# Patient Record
Sex: FEMALE | Race: WHITE
[De-identification: names, ages, dates, MRNs, and addresses within clinical notes are randomized per-mention and may not be internally consistent; named-entity substitution may affect disease eponyms.]

---

## 2017-01-12 ENCOUNTER — HOSPITAL ENCOUNTER (OUTPATIENT)
Dept: HOSPITAL 80 - FSGY | Age: 57
Discharge: HOME | End: 2017-01-12
Attending: ORTHOPAEDIC SURGERY
Payer: COMMERCIAL

## 2017-01-12 DIAGNOSIS — S83.282A: ICD-10-CM

## 2017-01-12 DIAGNOSIS — I10: ICD-10-CM
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
## 2017-01-12 DIAGNOSIS — S83.242A: Primary | ICD-10-CM

## 2017-01-12 DIAGNOSIS — G47.33: ICD-10-CM

## 2017-01-12 DIAGNOSIS — M22.42: ICD-10-CM

## 2017-01-12 DIAGNOSIS — K21.9: ICD-10-CM

## 2017-01-12 PROCEDURE — 0SBD4ZZ EXCISION OF LEFT KNEE JOINT, PERCUTANEOUS ENDOSCOPIC APPROACH: ICD-10-PCS | Performed by: ORTHOPAEDIC SURGERY

## 2017-01-12 NOTE — GOP
[f rep st]



                                                                OPERATIVE REPORT





DATE OF OPERATION:  01/12/2017



SURGEON:  SHIRAZ Morillo MD



ANESTHESIA:  General, Dr. Castillo.



PREOPERATIVE DIAGNOSIS:  

1.  Medical meniscus tear, left knee.

2.  Patellofemoral chondromalacia, left knee.



POSTOPERATIVE DIAGNOSIS:  

1.  Medical meniscus tear, left knee.

2.  Lateral meniscus tear, left knee.

3.  Patellofemoral chondromalacia, left knee.



PROCEDURE PERFORMED:  

1.  Left knee arthroscopy with partial medial meniscectomy.

2.  Partial lateral meniscectomy, left knee.



FINDINGS:  



INDICATIONS:  This is a 56-year-old female who injured her knee resulting in the above diagnosis.  Sh
liza has failed nonoperative treatment, and is being admitted for surgical care.



DESCRIPTION OF PROCEDURE:  After an adequate general anesthetic was obtained, exam under anesthesia r
evealed a full range of motion.  Lachman was stable.  There was no varus, valgus or posterolateral la
xity present.  Pivot shift stable.  



The patient's left lower extremity was placed in the leg holding device with adequate padding, and wa
s prepped and draped in the usual sterile fashion.  The limb was exsanguinated with the Esmarch, and 
the tourniquet elevated to 300 mmHg pressure.  



The standard superolateral, anteromedial and anterolateral arthroscopic portals were established with
 an 11 blade.  A 4 mm 30-degree arthroscope was introduced through the anterolateral portal, and a sy
stematic examination of the knee was carried out.  Suprapatellar pouch, medial and lateral gutters re
vealed mild synovitis.  The patellar articular surface revealed very severe, deep grade 3 chondromala
margaret which required a minimal chondroplasty.  The femoral trochlea also revealed grade 2-3 chondromala
margaret centrally. 



The ACL and PCL were normal.  



The lateral compartment was entered.  The lateral articular surfaces revealed grade 1 softening.  The
re was a small radial tear at the junction of the middle third and posterior horn of the lateral meni
scus.  A partial lateral meniscectomy was carried out, resecting this back to a stable 6 mm rim.  The
 final rim was contoured and balanced with a small shaver.  



The medial compartment was entered.  There was grade 1 softening on the medial femoral condyle and ti
bial plateau.  There was a grade 2 chondral fissure in the tibial plateau which was stable to probing
.  There was a flap tear in the posterior horn of the medial meniscus which was unstable to probing a
nd was unrepairable as it was entirely in the avascular zone.  A partial medial meniscectomy was osborne
ied out resecting this back to a stable 3 mm rim.  The final rim was contoured and balanced with a 
all shaver.  



The 70-degree scope was introduced posteromedially and posterolaterally with no further posterior pat
hology noted. 



The knee was irrigated until clear.  The portals were closed using interrupted 3-0 nylon sutures.  20
 cc of 0.25% plain Marcaine was injected intra-articularly. 



Sterile dressings were applied, followed by an Ace wrap and the tourniquet deflated after 27 minutes 
tourniquet time. 



There were no complications.  The patient tolerated the procedure well, and returned to the recovery 
room in stable condition.





Job #:  [id number]

## 2017-02-08 ENCOUNTER — HOSPITAL ENCOUNTER (EMERGENCY)
Dept: HOSPITAL 80 - CED | Age: 57
Discharge: HOME | End: 2017-02-08
Payer: COMMERCIAL

## 2017-02-08 VITALS
DIASTOLIC BLOOD PRESSURE: 106 MMHG | SYSTOLIC BLOOD PRESSURE: 152 MMHG | HEART RATE: 81 BPM | TEMPERATURE: 97.2 F | RESPIRATION RATE: 93 BRPM

## 2017-02-08 DIAGNOSIS — M71.22: ICD-10-CM

## 2017-02-08 DIAGNOSIS — R22.42: Primary | ICD-10-CM

## 2017-02-08 NOTE — US
Left Lower Extremity Ultrasound and Venous Duplex Doppler Study



History:  Left knee surgery January 12, 2017, foot swelling.



Comparison: None available.



Technique:  High frequency transducer was used for imaging and Doppler study of the veins of the lowe
r extremity.   Pulsed Doppler and color Doppler were utilized, along with various maneuvers to assess
 flow in the veins. 

 

Findings:  The deep veins of the lower extremity are normally compressible between the groin and the 
upper calf and have normal Doppler waveforms within them.  The calf veins are difficult to visualize.
 No venous thrombosis is identified. There is a 3.0 x 0.9 x 5.1 cm hypoechoic collection in the popli
teal fossa, suggesting a mildly complex Baker's cyst.

  

Impression:  

1. No evidence of deep vein thrombosis.

2. Baker's cyst.



Findings discussed with Dr. Jazmine Turk, on February 8, 2017 at 1629 hours.

## 2017-02-08 NOTE — UCPHY
H & P


Patient Type: New


Chief Complaint Nursing Narrative: left leg swollen . R/O blood clot


Source: Patient


Exam Limitations: No limitations





- Personal History


Tetanus Vaccine Date: WITHIN 10 YRS





- Medical/Surgical History


Hx Asthma: No


Hx Chronic Respiratory Disease: No


Hx Diabetes: No


Hx Cardiac Disease: No


Hx Renal Disease: No


Hx Cirrhosis: No


Hx Alcoholism: No


Hx HIV/AIDS: No


Hx Splenectomy or Spleen Trauma: No


Other PMH: knee surgery on January 12th. Airplane flight last night





- Family History


Significant Family History: Other (Daughter had DVT and pulmonary embolism)





- Social History


Smoking Status: Never smoked


Time Seen by Provider: 02/08/17 16:28


HPI/ROS: 


HPI:  57-year-old female presents to urgent care with chief concern left leg 

swelling. Had a left knee scope on January 12th.  She flu yesterday evening 

from [place] to [place].  Reports onset of left ankle and left leg swelling 

today.  Denies fever, chills, shortness of breath, chest pain, nausea, vomiting

, calf pain. No aggravating or alleviating factors.  Daughter had DVT and 

pulmonary embolism could





ROS:10 point review of systems is negative other than as stated in HPI (Marissa Jaimes)








- Social History


Additional Social History: 


RN on Oncology floor (Marissa Jaimes)








- Physical Exam


Exam: 


Vital signs reviewed by me


General:  Awake, alert, calm, cooperative.  No acute distress.


Head:  Normalocephalic.  Atraumatic.


EENT:  PERRLA.  EOMI.  No pallor or injection. Anicteric. No nystagmus. No 

injection. 


Neck:  Supple, nontender.  No lymphadenopathy.  Full range of motion.  No 

meningismus.


Respiratory:  Breathing unlabored.  Breath sounds equal bilaterally and clear 

to auscultation.  No adventitious sounds.


CV:  Chest nontender, atraumatic. Heart rate regular.  No murmur, distal pulses 

2+ bilaterally. Brisk cap refill all extremities.


GI:  Abdomen soft, nontender.  Bowel sounds normoactive and positive x4 

quadrants.


Neuro:  Alert.  Oriented x 3.  Speech clear. Nonfocal cranial nerves 

throughout. Sensation intact all extremities.


Skin:  Skin warm, dry, intact.  Skin turgor normal. 


Extremities:  Full range of motion in all 4 extremities. Strength 5+ all 

extremities. Negative calf pain. Negative Homans.  1+ pitting pedal edema left 

ankle. (Marissa Jaimes)





Constitutional: 





 Initial Vital Signs











Temperature (C)  36.2 C   02/08/17 15:14


 


Heart Rate  81   02/08/17 15:14


 


Respiratory Rate  93 H  02/08/17 15:14


 


Blood Pressure  152/106 H  02/08/17 15:14








 











O2 Delivery Mode               Room Air

















Allergies/Adverse Reactions: 


 





acetaminophen [From Percocet] Allergy (Verified 01/09/14 08:17)


 


adhesive Allergy (Verified 03/25/14 11:55)


 


oxycodone HCl [From Percocet] Allergy (Verified 01/09/14 08:17)


 


pneumococcal 23-valent polysacchari [From Pneumovax 23] Allergy (Verified 03/22/ 11 20:30)


 


Shellfish *RETIRED-09/10/12 [Shellfish] Allergy (Verified 03/22/11 20:30)


 








Home Medications: 














 Medication  Instructions  Recorded


 


ALPRAZolam [Xanax] 0.5 - 1 mg PO PRN PRN MDD 1 03/22/11


 


ESTRADIOL 1 mg PO DAILY 03/22/11


 


Lisinopril/Hydrochlorothiazide 1 each PO DAILY 03/22/11





[Lisinopril-Hctz 20-12.5 Mg Tab]  


 


Temazepam [Restoril] 20 - 30 mg PO HS PRN 03/22/11














Medical Decision Making





- Diagnostics


Imaging: 


Ultrasound negative for DVT.  Positive Méndez's cyst.  Final report pending at 

time this dictation. (Marissa Jaimes)





ED Course/Re-evaluation: 


57-year-old female presents to urgent care with left leg swelling that onset 

over the last 24 hours.  No shortness of breath or chest pain. Denies calf 

pain. Ultrasound negative for DVT. (Marissa Jaimes)





Differential Diagnosis: 


Differential diagnosis includes but is not limited to dependent edema, CHF, 

venous stasis (Marissa Jaimes)





Other Provider: 


The patient was evaluated and managed by the nurse practitioner, Marissa Jaimes. 

My co-signature indicates that I





have reviewed this chart and I agree with the findings and plan of care as 

documented. I am the





secondary supervising physician. (Jazmine Turk)








Departure





- Departure


Disposition: Home, Routine, Self-Care


Clinical Impression: 


 Leg swelling, Baker cyst


Condition: Good


Instructions:  Leg Edema (ED), Bakers Cyst (ED)


Additional Instructions: 


Plan:





Ultrasound is negative for DVT





You have a Baker's cyst





Follow up with your primary care provider tomorrow for recheck without fail--

When you call to schedule appointment, please let the office know you are an "

ER follow up" appointment"


Referrals: 


Genia Martinez MD [Primary Care Provider] - As per Instructions


Katie Johnson PA [Physician Assistant] - As per Instructions





- PQRS


PQRS Measurement: 


Not applicable (Marissa Jaimes)

## 2017-08-16 ENCOUNTER — HOSPITAL ENCOUNTER (OUTPATIENT)
Dept: HOSPITAL 80 - FIMAGING | Age: 57
End: 2017-08-16
Attending: PHYSICIAN ASSISTANT
Payer: COMMERCIAL

## 2017-08-16 DIAGNOSIS — R92.8: ICD-10-CM

## 2017-08-16 DIAGNOSIS — Z12.39: Primary | ICD-10-CM

## 2017-08-16 PROCEDURE — G0204 DX MAMMO INCL CAD BI: HCPCS

## 2019-01-23 ENCOUNTER — HOSPITAL ENCOUNTER (OUTPATIENT)
Dept: HOSPITAL 80 - FIMAGING | Age: 59
End: 2019-01-23
Attending: PHYSICIAN ASSISTANT
Payer: COMMERCIAL

## 2019-01-23 DIAGNOSIS — Z80.3: ICD-10-CM

## 2019-01-23 DIAGNOSIS — Z12.31: Primary | ICD-10-CM

## 2019-01-23 DIAGNOSIS — N63.21: ICD-10-CM

## 2019-03-08 ENCOUNTER — HOSPITAL ENCOUNTER (OUTPATIENT)
Dept: HOSPITAL 80 - FIMAGING | Age: 59
End: 2019-03-08
Attending: PHYSICIAN ASSISTANT
Payer: COMMERCIAL

## 2019-03-08 DIAGNOSIS — K57.32: Primary | ICD-10-CM

## 2019-03-08 DIAGNOSIS — K42.9: ICD-10-CM

## 2019-03-08 DIAGNOSIS — Z90.49: ICD-10-CM

## 2019-03-08 DIAGNOSIS — Z90.710: ICD-10-CM

## 2019-03-11 ENCOUNTER — HOSPITAL ENCOUNTER (OUTPATIENT)
Dept: HOSPITAL 80 - FED | Age: 59
Setting detail: OBSERVATION
LOS: 1 days | Discharge: HOME | End: 2019-03-12
Attending: INTERNAL MEDICINE | Admitting: INTERNAL MEDICINE
Payer: COMMERCIAL

## 2019-03-11 DIAGNOSIS — F43.10: ICD-10-CM

## 2019-03-11 DIAGNOSIS — E87.0: ICD-10-CM

## 2019-03-11 DIAGNOSIS — I10: ICD-10-CM

## 2019-03-11 DIAGNOSIS — K57.32: Primary | ICD-10-CM

## 2019-03-11 DIAGNOSIS — R11.2: ICD-10-CM

## 2019-03-11 DIAGNOSIS — K59.00: ICD-10-CM

## 2019-03-11 LAB — PLATELET # BLD: 367 10^3/UL (ref 150–400)

## 2019-03-11 PROCEDURE — G0378 HOSPITAL OBSERVATION PER HR: HCPCS

## 2019-03-11 PROCEDURE — 74019 RADEX ABDOMEN 2 VIEWS: CPT

## 2019-03-11 RX ADMIN — SODIUM CHLORIDE SCH MLS: 900 INJECTION, SOLUTION INTRAVENOUS at 16:48

## 2019-03-11 RX ADMIN — TAZOBACTAM SODIUM AND PIPERACILLIN SODIUM SCH MLS: 375; 3 INJECTION, SOLUTION INTRAVENOUS at 17:44

## 2019-03-11 NOTE — EDPHY
H & P


Stated Complaint: abd pain


Time Seen by Provider: 03/11/19 14:11


HPI/ROS: 





CHIEF COMPLAINT:  Diverticulitis





HISTORY OF PRESENT ILLNESS:  This is a 59-year-old female who was diagnosed 

with diverticulitis 3 days ago.  At that time she underwent CT scanning of her 

abdomen, ordered by her primary care provider.  This study showed acute 

diverticulitis involving the distal descending colon and upper sigmoid with 

extensive pericolonic stranding but no abscess, pneumoperitoneum, mechanical 

obstruction.  She was started on ciprofloxacin, Flagyl, and ondansetron.  She 

began a clear liquid diet.  However, for the past 2 days, she has been vomiting 

and unable to take her oral medications.  As result, she presents the emergency 

department today.  She is not had a bowel movement for the last 2 days.  At no 

time did she have bloody diarrhea.  She has not had fever.  This is her 1st 

bout of diverticulitis.





REVIEW OF SYSTEMS:


A ten system review of systems was performed and is negative with the exception 

of the items mentioned in the HPI.





Past medical and surgical history:  


1. PTSD


2. Hypertension


3. Left meniscus repair


4. Hysterectomy 


5. Cholecystectomy





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  She lives with .  She does not use tobacco products.





General Appearance:  Alert.  Vital signs reviewed.  


Eyes:  Pupils equal and round, no conjunctival injection, no discharge. 

Anicteric.


ENT, Mouth:  Mucous membranes are slightly dry, no oropharyngeal erythema or 

edema.


Neck:  No lymphadenopathy, supple.


Respiratory:  Lungs are clear to auscultation; no wheezes, rales, or rhonchi.


Cardiovascular:  Regular rate and rhythm; no murmur, rub, or gallop.


Gastrointestinal:  Abdomen is soft with tenderness and guarding in the left 

lower quadrant.


Skin:  Warm and dry, no rashes on exposed skin, normal color.


Back:  Nontender to palpation over the thoracolumbar spine. No CVAT.


Extremities:  No lower extremity edema, no calf tenderness or swelling.


Neurological:  Alert and oriented.  Moving all four extremities easily and 

equally.


Psychiatric:  Normal affect.





- Personal History


Current Tetanus/Diphtheria Vaccine: Yes


Current Tetanus Diphtheria and Acellular Pertussis (TDAP): Yes


Tetanus Vaccine Date: WITHIN 10 YRS





- Medical/Surgical History


Hx Asthma: No


Hx Chronic Respiratory Disease: No


Hx Diabetes: No


Hx Cardiac Disease: No


Hx Renal Disease: No


Hx Cirrhosis: No


Hx Alcoholism: No


Hx HIV/AIDS: No


Hx Splenectomy or Spleen Trauma: No


Other PMH: knee surgery on January 12th. Airplane flight last night, 

diverticulitis





- Social History


Smoking Status: Never smoked


Constitutional: 


 Initial Vital Signs











Temperature (C)  36.8 C   03/11/19 13:51


 


Heart Rate  90   03/11/19 13:51


 


Respiratory Rate  16   03/11/19 13:51


 


Blood Pressure  112/87 H  03/11/19 13:51


 


O2 Sat (%)  91 L  03/11/19 13:51








 











O2 Delivery Mode               Room Air














Allergies/Adverse Reactions: 


 





Shellfish *RETIRED-09/10/12 [Shellfish] Allergy (Severe, Verified 03/11/19 15:33

)


 Anaphylaxis


adhesive Allergy (Verified 03/11/19 13:50)


 


oxycodone HCl [From Percocet] Allergy (Verified 03/11/19 16:42)


 "Things go far away"


pneumococcal 23-valent polysacchari [From Pneumovax 23] Allergy (Verified 03/11/ 19 13:50)


 








Home Medications: 














 Medication  Instructions  Recorded


 


Ciprofloxacin [Cipro] 500 mg PO BID 03/11/19


 


EPINEPHrine [Epipen 0.3 MG] 0.3 mg IM ONCE PRN 03/11/19


 


Estradiol [Estradiol 1 MG (*)] 1 mg PO DAILY 03/11/19


 


Lisinopril/Hydrochlorothiazide 1 each PO DAILY 03/11/19





[Lisinopril-Hctz 10-12.5 mg Tab]  


 


Ondansetron [Ondansetron Odt] 8 mg PO TID 03/11/19


 


metroNIDAZOLE [Flagyl 500 mg (*)] 500 mg PO TID 03/11/19














Medical Decision Making


ED Course/Re-evaluation: 





59-year-old female with diverticulitis who has failed treatment.  She is being 

admitted to the hospitalist service.  In the emergency department ceftriaxone 2 

g IV and Flagyl 500 mg a ordered.  1 L normal saline administered.  Pain 

medicine available as needed.





She is afebrile.  Her abdomen is quite tender in left lower quadrant.  She will 

need to watch carefully for any evidence of abscess, perforation, or 

obstruction.





Labs pending at 3:25 p.m..





I reviewed the report of her CT scan done on March 8th.  I reviewed her blood 

work from 3 days ago.


Differential Diagnosis: 





Abdominal pain including but not limited to appendicitis, bowel obstruction, 

viscus perforation, intra-abdominal abscess, diverticulitis, gastritis and 

urinary tract infection.





- Data Points


Laboratory Results: 


 Laboratory Results





 03/11/19 14:20 





 03/11/19 14:20 








Medications Given: 


 








Discontinued Medications





Acetaminophen (Tylenol)  650 mg PO EDNOW ONE


   Stop: 03/11/19 14:44


   Last Admin: 03/11/19 15:22 Dose:  650 mg


Acetaminophen (Tylenol)  650 mg PO Q4HRS PRN


   PRN Reason: Pain, Mild/Fever, Can Take PO


   Stop: 09/07/19 15:20


   Last Admin: 03/11/19 19:25 Dose:  650 mg


Estradiol (Estradiol)  1 mg PO DAILY ARUJN


   Stop: 09/08/19 09:14


   Last Admin: 03/12/19 09:47 Dose:  1 mg


Lisinopril/HCTZ (Zestoretic)  1 ea PO DAILY ARJUN


   Stop: 09/08/19 09:14


   Last Admin: 03/12/19 09:47 Dose:  1 ea


Sodium Chloride (Ns)  1,000 mls @ 0 mls/hr IV EDNOW ONE; Wide [place]


   PRN Reason: Protocol


   Stop: 03/11/19 14:39


   Last Admin: 03/11/19 14:46 Dose:  1,000 mls


Ceftriaxone Sodium 2 gm/ (Sodium Chloride)  50 mls @ 100 mls/hr IV EDNOW ONE


   PRN Reason: Protocol


   Stop: 03/11/19 15:48


   Last Admin: 03/11/19 16:47 Dose:  50 mls


Metronidazole/Sodium Chloride (Flagyl 500 Mg (Premix))  100 mls @ 100 mls/hr IV 

EDNOW ONE


   PRN Reason: Protocol


   Stop: 03/11/19 16:19


   Last Admin: 03/11/19 15:37 Dose:  100 mls


Sodium Chloride (Ns)  1,000 mls @ 150 mls/hr IV CONT ARJUN


   Stop: 09/07/19 15:29


   Last Admin: 03/12/19 05:50 Dose:  1,000 mls


Piperacillin/Tazobactam/Dextrose (Zosyn 3.375 Gm (Premix))  50 mls @ 100 mls/hr 

IV Q6HRS ARJUN


   PRN Reason: Protocol


   Stop: 04/10/19 17:59


   Last Admin: 03/12/19 12:17 Dose:  50 mls


Ondansetron HCl (Zofran)  4 mg IVP EDNOW ONE


   Stop: 03/11/19 14:39


   Last Admin: 03/11/19 14:46 Dose:  4 mg


Ondansetron HCl (Zofran Odt)  4 mg PO Q4HRS PRN


   PRN Reason: Nausea/Vomiting, Use 1st


   Stop: 09/07/19 15:20


   Last Admin: 03/11/19 19:28 Dose:  4 mg


Promethazine HCl (Phenergan)  6.25 - 12.5 mg IVP Q6HRS PRN


   PRN Reason: Nausea/Vomiting, Use 2nd


   Stop: 09/07/19 15:20


   Last Admin: 03/12/19 08:04 Dose:  6.25 mg








Departure





- Departure


Disposition: [facility]


Clinical Impression: 


 Diverticulitis





Condition: Good

## 2019-03-11 NOTE — PDGENHP
History and Physical





- Chief Complaint


abd pain/n/v





- History of Present Illness


58 yo F with PMH of HTN presenting to ER with complaints of n/v/abdominal pain. 

Patient was originally seen by her PCP on 3/8/19 for these sxs at which time an 

abdominal CT was performed revealing acute diverticulitis of distal descending 

and upper sigmoid colon. She was sent home with rx for cipro/flagyl but 

unfortunately over the subsequent several days her pain and n/v increased and 

she has been unable to hold down her medications or eat or drink very much. She 

notes the pain has seemed to increase and she has not been able to have a BM 

for the last several days either. She has not had fever or chills. She has 

never had similar issues in the past. 





History Information





- Allergies/Home Medication List


Allergies/Adverse Reactions: 








Shellfish *RETIRED-09/10/12 [Shellfish] Allergy (Severe, Verified 03/11/19 15:33

)


 Anaphylaxis


adhesive Allergy (Verified 03/11/19 13:50)


 


oxycodone HCl [From Percocet] Allergy (Verified 03/11/19 16:42)


 "Things go far away"


pneumococcal 23-valent polysacchari [From Pneumovax 23] Allergy (Verified 03/11/ 19 13:50)


 





Home Medications: 








Ciprofloxacin [Cipro] 500 mg PO BID 03/11/19 [Last Taken 03/10/19]


EPINEPHrine [Epipen 0.3 MG] 0.3 mg IM ONCE PRN 03/11/19 [Last Taken Unknown]


Estradiol [Estradiol 1 MG (*)] 1 mg PO DAILY 03/11/19 [Last Taken 03/11/19]


Lisinopril/Hydrochlorothiazide [Lisinopril-Hctz 10-12.5 mg Tab] 1 each PO DAILY 

03/11/19 [Last Taken 03/11/19]


Ondansetron [Ondansetron Odt] 8 mg PO TID 03/11/19 [Last Taken 03/11/19]


metroNIDAZOLE [Flagyl 500 mg (*)] 500 mg PO TID 03/11/19 [Last Taken 03/11/19]





I have personally reviewed and updated: family history, medical history, social 

history, surgical history





- Past Medical History


hypertension





- Surgical History


Reports: cholecystectomy, hysterectomy


Additional surgical history: tonsillectomy.  knee surgery





- Family History


Positive for: non-pertinent





- Social History


Smoking Status: Never smoked


Alcohol Use: None


Drug Use: None


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





Review of Systems


Review of Systems: 





ROS: 10pt was reviewed & negative except for what was stated in HPI & below





Physical Exam


Physical Exam: 

















Temp Pulse Resp BP Pulse Ox


 


 36.8 C   70   20   128/73 H  91 L


 


 03/11/19 13:51  03/11/19 15:38  03/11/19 15:38  03/11/19 15:38  03/11/19 15:38











Constitutional: obese, uncomfortable


Eyes: PERRL, anicteric sclera


Ears, Nose, Mouth, Throat: moist mucous membranes, hearing normal


Cardiovascular: regular rate and rhythym, no murmur, rub, or gallop, No edema


Respiratory: no respiratory distress, no rales or rhonchi, clear to auscultation


Gastrointestinal: tenderness (LLQ), No normoactive bowel sounds, No guarding, 

No rebound, No distension


Genitourinary: no bladder tenderness


Skin: warm, normal color


Musculoskeletal: full muscle strength


Neurologic: AAOx3


Psychiatric: interacting appropriately, not anxious, not encephalopathic





Lab Data & Imaging Review





 03/11/19 14:20





 03/11/19 14:20














WBC  8.31 10^3/uL (3.80-9.50)   03/11/19  14:20    


 


RBC  4.77 10^6/uL (4.18-5.33)   03/11/19  14:20    


 


Hgb  14.8 g/dL (12.6-16.3)   03/11/19  14:20    


 


Hct  43.8 % (38.0-47.0)   03/11/19  14:20    


 


MCV  91.8 fL (81.5-99.8)   03/11/19  14:20    


 


MCH  31.0 pg (27.9-34.1)   03/11/19  14:20    


 


MCHC  33.8 g/dL (32.4-36.7)   03/11/19  14:20    


 


RDW  12.5 % (11.5-15.2)   03/11/19  14:20    


 


Plt Count  367 10^3/uL (150-400)   03/11/19  14:20    


 


MPV  8.8 fL (8.7-11.7)   03/11/19  14:20    


 


Neut % (Auto)  74.2 % (39.3-74.2)   03/11/19  14:20    


 


Lymph % (Auto)  18.4 % (15.0-45.0)   03/11/19  14:20    


 


Mono % (Auto)  6.6 % (4.5-13.0)   03/11/19  14:20    


 


Eos % (Auto)  0.2 % (0.6-7.6)  L  03/11/19  14:20    


 


Baso % (Auto)  0.4 % (0.3-1.7)   03/11/19  14:20    


 


Nucleat RBC Rel Count  0.0 % (0.0-0.2)   03/11/19  14:20    


 


Absolute Neuts (auto)  6.16 10^3/uL (1.70-6.50)   03/11/19  14:20    


 


Absolute Lymphs (auto)  1.53 10^3/uL (1.00-3.00)   03/11/19  14:20    


 


Absolute Monos (auto)  0.55 10^3/uL (0.30-0.80)   03/11/19  14:20    


 


Absolute Eos (auto)  0.02 10^3/uL (0.03-0.40)  L  03/11/19  14:20    


 


Absolute Basos (auto)  0.03 10^3/uL (0.02-0.10)   03/11/19  14:20    


 


Absolute Nucleated RBC  0.00 10^3/uL (0-0.01)   03/11/19  14:20    


 


Immature Gran %  0.2 % (0.0-1.1)   03/11/19  14:20    


 


Immature Gran #  0.02 10^3/uL (0.00-0.10)   03/11/19  14:20    


 


Sodium  133 mEq/L (135-145)  L  03/11/19  14:20    


 


Potassium  3.8 mEq/L (3.5-5.2)   03/11/19  14:20    


 


Chloride  97 mEq/L ()   03/11/19  14:20    


 


Carbon Dioxide  26 mEq/l (22-31)   03/11/19  14:20    


 


Anion Gap  10 mEq/L (6-14)   03/11/19  14:20    


 


BUN  7 mg/dL (7-23)   03/11/19  14:20    


 


Creatinine  0.8 mg/dL (0.6-1.0)   03/11/19  14:20    


 


Estimated GFR  > 60   03/11/19  14:20    


 


Glucose  112 mg/dL ()  H  03/11/19  14:20    


 


Calcium  9.2 mg/dL (8.5-10.4)   03/11/19  14:20    








Visualized and Interpreted imaging results: Yes


Interpretation: abd CT: descending colon diverticulitis without abscess or 

perforation





Assessment & Plan


Assessment: 








Diverticulitis (Acute)





58 yo F with PMH of HTN presenting with acute diverticulitis failing OP 

management





# acute diverticulitis: patient has been failing outpatient management with 

increased n/v and inability to tolerate PO. Abdominal exam is reassuring 

although she does have decreased bowel sounds. Will get abdominal plain film to 

eval for signs of SBO or perforation which seems unlikely. Will switch abx to 

IV zosyn for now. IVF, antiemetics, IV opiates for pain management. If sxs fail 

to improve as expected or concern raised by abd xray will get repeat ct. 


# n/v: in the setting of above and presumably due to same, will provide prn 

antiemetics and IVF, advance diet as tolerated


# hyponatremia: presumably hypovolemic hyponatremia in the setting of poor po 

intake as above


# hypertension: BP has been borderline low since arrival, will hold her home 

lisinopril/hctz for now


# observation status


Patient new to my care. Old records reviewed and summarized as above. Care plan 

reviewed with ER doctor as above.

## 2019-03-12 VITALS — DIASTOLIC BLOOD PRESSURE: 76 MMHG | SYSTOLIC BLOOD PRESSURE: 104 MMHG

## 2019-03-12 LAB — PLATELET # BLD: 352 10^3/UL (ref 150–400)

## 2019-03-12 RX ADMIN — TAZOBACTAM SODIUM AND PIPERACILLIN SODIUM SCH MLS: 375; 3 INJECTION, SOLUTION INTRAVENOUS at 12:17

## 2019-03-12 RX ADMIN — TAZOBACTAM SODIUM AND PIPERACILLIN SODIUM SCH MLS: 375; 3 INJECTION, SOLUTION INTRAVENOUS at 05:50

## 2019-03-12 RX ADMIN — PROMETHAZINE HYDROCHLORIDE PRN MG: 25 INJECTION INTRAMUSCULAR; INTRAVENOUS at 08:04

## 2019-03-12 RX ADMIN — SODIUM CHLORIDE SCH MLS: 900 INJECTION, SOLUTION INTRAVENOUS at 05:50

## 2019-03-12 RX ADMIN — TAZOBACTAM SODIUM AND PIPERACILLIN SODIUM SCH MLS: 375; 3 INJECTION, SOLUTION INTRAVENOUS at 00:42

## 2019-03-12 RX ADMIN — PROMETHAZINE HYDROCHLORIDE PRN MG: 25 INJECTION INTRAMUSCULAR; INTRAVENOUS at 00:41

## 2019-03-12 NOTE — GDS
[f rep st]



                                                             DISCHARGE SUMMARY





DISCHARGE DIAGNOSIS:  Diverticulitis.



PHYSICAL EXAM:  GENERAL:  The patient is alert.  VITAL SIGNS:  Afebrile at 36.9, pulse 53, respirator
y rate 16, and blood pressure 104/76.  She is saturating 91% on room air.  I have seen and evaluated 
the patient on the day of discharge.



STUDIES AND PROCEDURES DONE:  Abdominal x-ray.



HOSPITAL COURSE:  The patient is a 59-year-old female, who was diagnosed with diverticulitis in the o
utpatient setting and started on ciprofloxacin, as well as Flagyl.  She presented to the emergency ro
om with complaints of increasing abdominal pain.  She was evaluated and diagnosed with:

1.  Acute diverticulitis.  During this hospitalization she was treated with IV Zosyn.  Her symptoms h
ave completely improved and resolved.  She is tolerating a regular diet.  She has no further abdomina
l pain, and she is eager to be discharged home.  She states that she will be able to resume her previ
ously prescribed ciprofloxacin, as well as Flagyl.

2.  Constipation.  This has resolved during this hospital course.  I suspect this was the patient's u
nderlying increase in discomfort.  She has been educated to remain regular.

3.  Nausea and vomiting.  These, too, have resolved with antiemetics and resolution of her constipati
on.

4.  Hyponatremia in the setting of prerenal azotemia.  She has responded well to IV hydration.  Her s
odium is within normal limits.

5.  Hypertension.  Her home medications have been continued.



DISPOSITION:  She will be discharged home with her  this afternoon independently.  There are n
o pending studies.



DISCHARGE MEDICATIONS:  Please refer to EMR form.  I have not discontinued the patient's previously p
rescribed home medications, and she will resume her Flagyl and Cipro as previously indicated in the o
utpatient setting.



FOLLOWUP:  Followup will be with Katie Chowdhury, the patient's primary care provider.  I did offer the
 patient another night in the hospital for further IV antibiotic therapy.  She has refused and would 
like to be discharged home.





Job #:  [id number]